# Patient Record
Sex: MALE | Race: WHITE | HISPANIC OR LATINO | URBAN - METROPOLITAN AREA
[De-identification: names, ages, dates, MRNs, and addresses within clinical notes are randomized per-mention and may not be internally consistent; named-entity substitution may affect disease eponyms.]

---

## 2020-10-15 ENCOUNTER — EMERGENCY (EMERGENCY)
Facility: HOSPITAL | Age: 85
LOS: 1 days | Discharge: HOME | End: 2020-10-15
Attending: EMERGENCY MEDICINE | Admitting: EMERGENCY MEDICINE
Payer: MEDICARE

## 2020-10-15 VITALS
SYSTOLIC BLOOD PRESSURE: 158 MMHG | RESPIRATION RATE: 16 BRPM | TEMPERATURE: 98 F | HEIGHT: 61 IN | HEART RATE: 48 BPM | WEIGHT: 126.99 LBS | OXYGEN SATURATION: 100 % | DIASTOLIC BLOOD PRESSURE: 84 MMHG

## 2020-10-15 VITALS
RESPIRATION RATE: 16 BRPM | OXYGEN SATURATION: 98 % | HEART RATE: 54 BPM | DIASTOLIC BLOOD PRESSURE: 79 MMHG | SYSTOLIC BLOOD PRESSURE: 149 MMHG | TEMPERATURE: 98 F

## 2020-10-15 PROCEDURE — 72125 CT NECK SPINE W/O DYE: CPT | Mod: 26

## 2020-10-15 PROCEDURE — 99284 EMERGENCY DEPT VISIT MOD MDM: CPT | Mod: 25

## 2020-10-15 PROCEDURE — 72125 CT NECK SPINE W/O DYE: CPT

## 2020-10-15 PROCEDURE — 96372 THER/PROPH/DIAG INJ SC/IM: CPT | Mod: XU

## 2020-10-15 PROCEDURE — 90715 TDAP VACCINE 7 YRS/> IM: CPT

## 2020-10-15 PROCEDURE — 70450 CT HEAD/BRAIN W/O DYE: CPT

## 2020-10-15 PROCEDURE — 12011 RPR F/E/E/N/L/M 2.5 CM/<: CPT

## 2020-10-15 PROCEDURE — 70450 CT HEAD/BRAIN W/O DYE: CPT | Mod: 26

## 2020-10-15 PROCEDURE — 90471 IMMUNIZATION ADMIN: CPT

## 2020-10-15 RX ORDER — LEVOTHYROXINE SODIUM 125 MCG
1 TABLET ORAL
Qty: 0 | Refills: 0 | DISCHARGE

## 2020-10-15 RX ORDER — ASPIRIN/CALCIUM CARB/MAGNESIUM 324 MG
1 TABLET ORAL
Qty: 0 | Refills: 0 | DISCHARGE

## 2020-10-15 RX ORDER — MEMANTINE HYDROCHLORIDE 10 MG/1
0 TABLET ORAL
Qty: 0 | Refills: 0 | DISCHARGE

## 2020-10-15 RX ORDER — ACETAMINOPHEN 500 MG
650 TABLET ORAL ONCE
Refills: 0 | Status: COMPLETED | OUTPATIENT
Start: 2020-10-15 | End: 2020-10-15

## 2020-10-15 RX ORDER — RISPERIDONE 4 MG/1
0 TABLET ORAL
Qty: 0 | Refills: 0 | DISCHARGE

## 2020-10-15 RX ORDER — TETANUS TOXOID, REDUCED DIPHTHERIA TOXOID AND ACELLULAR PERTUSSIS VACCINE, ADSORBED 5; 2.5; 8; 8; 2.5 [IU]/.5ML; [IU]/.5ML; UG/.5ML; UG/.5ML; UG/.5ML
0.5 SUSPENSION INTRAMUSCULAR ONCE
Refills: 0 | Status: COMPLETED | OUTPATIENT
Start: 2020-10-15 | End: 2020-10-15

## 2020-10-15 RX ORDER — DONEPEZIL HYDROCHLORIDE 10 MG/1
1 TABLET, FILM COATED ORAL
Qty: 0 | Refills: 0 | DISCHARGE

## 2020-10-15 RX ADMIN — Medication 650 MILLIGRAM(S): at 17:10

## 2020-10-15 RX ADMIN — Medication 1 MILLIGRAM(S): at 17:36

## 2020-10-15 RX ADMIN — Medication 650 MILLIGRAM(S): at 16:10

## 2020-10-15 RX ADMIN — TETANUS TOXOID, REDUCED DIPHTHERIA TOXOID AND ACELLULAR PERTUSSIS VACCINE, ADSORBED 0.5 MILLILITER(S): 5; 2.5; 8; 8; 2.5 SUSPENSION INTRAMUSCULAR at 16:10

## 2020-10-15 NOTE — ED PROVIDER NOTE - NSFOLLOWUPINSTRUCTIONS_ED_ALL_ED_FT
Take Tylenol for headaches. Return to ED for any worsening condition. Return for headache, vomiting, numbness/weakness, slurred speech, or any worsening condition.       Traumatismo craneal    LO QUE NECESITA SABER:    Un traumatismo craneal puede incluir el cuero cabelludo, la chayito, el cráneo o el cerebro y puede variar de leve a grave. Los efectos pueden aparecer inmediatamente después de la lesión o desarrollarse más tarde. Los efectos pueden durar poco tiempo o ser permanentes. Es posible que los médicos quieran revisar govea recuperación con el tiempo. El tratamiento puede cambiar a medida que usted se recupera o desarrolla nuevos problemas de pratik por el traumatismo craneal.    INSTRUCCIONES SOBRE [facility]:    Llame al número local de emergencias (911 en los [place]), o pídale a alguien que llame si:  •No es posible despertarlo.      •Usted sufre pawel convulsión.      •Usted zak de reaccionar cuando le hablan o se desmaya.      •Usted tiene visión borrosa o doble.      •Usted arrastra las palabras o se confunde al hablar.      •Usted tiene debilidad en govea brazo o pierna, pierde la sensación o presenta nuevos problemas de coordinación.      •Lena pupilas son más grandes de lo habitual o pawel pupila es de tamaño diferente de la otra.      •A usted le sale latonya o un líquido leobardo de los oídos o la nariz.      Busque atención médica de inmediato si:  •Usted tiene vómitos reiterados o brigitte.      •Se siente confundido.      •El dolor de sofia empeora o se vuelve intenso.      •Usted o pawel persona que lo cuida nota que le hyun más despertarse que de costumbre.      Llame a govea médico si:  •Lena síntomas xiong más de 6 semanas después de la lesión.      •Usted tiene preguntas o inquietudes acerca de govea condición o cuidado.      Medicamentos:  •Acetaminofénalivia el dolor y baja la fiebre. Está disponible sin receta médica. Pregunte la cantidad y la frecuencia con que debe tomarlos. Siga las indicaciones. Corinna las etiquetas de todos los demás medicamentos que esté usando para saber si también contienen acetaminofén, o pregunte a govea médico o farmacéutico. El acetaminofén puede causar daño en el hígado cuando no se brittany de forma correcta. No use más de 4 gramos (4000 miligramos) en total de acetaminofeno en un día.      •[place] lena medicamentos jillian se le haya indicado.Consulte con govea médico si usted bailey que govea medicamento no le está ayudando o si presenta efectos secundarios. Infórmele si es alérgico a cualquier medicamento. Mantenga pawel lista actualizada de los medicamentos, las vitaminas y los productos herbales que brittany. Incluya los siguientes datos de los medicamentos: cantidad, frecuencia y motivo de administración. Traiga con usted la lista o los envases de las píldoras a lena citas de seguimiento. Lleve la lista de los medicamentos con usted en blake de pawel emergencia.      Cuidados personales:  •Descanseo steven actividades tranquilas. Limite govea tiempo viendo la televisión, utilizando la computadora o realizando tareas que requieren mucho pensamiento. Regrese lentamente lena actividades acostumbradas jillian le indiquen. No practique deportes ni steven actividades que puedan provocarle un golpe en la sofia. Pregunte a govea médico cuándo puede regresar al deporte.      •Aplique hieloen la sofia de 15 a 20 minutos cada hora o jillian se le indique. Use pawel compresa de hielo o ponga hielo triturado en pawel bolsa de plástico. Cúbralo con pawel toalla antes de aplicarlo sobre govea piel. El hielo ayuda a evitar daño al tejido y a disminuir la inflamación y el dolor.      •Solicite que alguien se quede con usted por 24 horas, o jillian se le indique. Esta persona puede monitorearlo para detectar problemas y pedir ayuda si es necesario. Cuando se despierte, didier persona debe hacerle algunas preguntas cada pocas horas para fran si usted está pensando con claridad. Un ejemplo es preguntarle govea nombre o govea dirección.      Prevenga otro traumatismo craneal:  •Use un courtney que se ajuste correctamente.Steven esto cuando practica deportes, o alvaro pawel bicicleta, scooter o patineta. Los cascos ayudan a disminuir el riesgo de un traumatismo craneal grave. Hable con govea médico acerca de otras maneras en las que usted puede protegerse si practica deportes.      •Use el cinturón de seguridad cada vez que esté en un automóvil.[place] ayuda a disminuir el riesgo de sufrir un traumatismo craneal si tiene un accidente.      Acuda a la consulta de control con govea médico según las indicaciones:Anote lena preguntas para que se acuerde de hacerlas jackson lena visitas.

## 2020-10-15 NOTE — ED PROVIDER NOTE - CARE PROVIDER_API CALL
Hardy Oseguera  NEUROLOGY  [address] [place] [address]  Phone: [phone]  Fax: [phone]  Follow Up Time: 1-3 Days

## 2020-10-15 NOTE — ED ADULT NURSE REASSESSMENT NOTE - NS ED NURSE REASSESS COMMENT FT1
Patient was discharged, stood up and felt dizzy. Patient stumbled and was assisted back in bed by staff. VSS Patient was discharged, stood up and felt dizzy. Per patient's son, he caught patient and stopped him from falling. Patient was assisted back in bed by staff. VSS. Patient did not hit head, no injuries.

## 2020-10-15 NOTE — ED ADULT NURSE NOTE - OBJECTIVE STATEMENT
87 y/o male presents with complaints of laceration S/P fall. States he was here at the hospital for pre-surgical testing when he was leaving he tripped and fell. States he hit the right side of his head on the floor causing a laceration. Denies pain or discomfort. Denies LOC. Plan of care discussed with patient. Comfort and safety maintained. Will continue to monitor.

## 2020-10-15 NOTE — ED ADULT NURSE NOTE - NSIMPLEMENTINTERV_GEN_ALL_ED
Implemented All Fall Risk Interventions:  [place] to call system. Call bell, personal items and telephone within reach. Instruct patient to call for assistance. Room bathroom lighting operational. Non-slip footwear when patient is off stretcher. Physically safe environment: no spills, clutter or unnecessary equipment. Stretcher in lowest position, wheels locked, appropriate side rails in place. Provide visual cue, wrist band, yellow gown, etc. Monitor gait and stability. Monitor for mental status changes and reorient to person, place, and time. Review medications for side effects contributing to fall risk. Reinforce activity limits and safety measures with patient and family.

## 2020-10-15 NOTE — ED PROVIDER NOTE - CLINICAL SUMMARY MEDICAL DECISION MAKING FREE TEXT BOX
c/o trip and fall with head injury and skin tear to right eye brow. plan includes adacel, Ct head r/o CVA, Ct cervical r/o fx, re-assess

## 2020-10-15 NOTE — ED ADULT NURSE NOTE - CHPI ED NUR SYMPTOMS NEG
no fever/no loss of consciousness/no weakness/no deformity/no confusion/no tingling/no numbness/no vomiting

## 2020-10-15 NOTE — ED PROVIDER NOTE - ATTENDING CONTRIBUTION TO CARE
Pt seen and examined and d/w PA.  agree with a and p.  pt is a 83 yo male , dementia, sp trip and fall, no loc, hit right side of head just lateral to eyebrow with small laceration,  needs td, no  loc, n/v, numbness, weakness and no co. pt on exam with 1cm laceration with surrounding abrasion just to side of right eyebrow, neuro intact, gcs 15, c spine nt,  pt walking around er, no bony ttp, motor 5/5.  abd benign, spine nt,   check ct head, c spine, glue wound, tdap. d/c if neg

## 2020-10-15 NOTE — ED PROVIDER NOTE - PATIENT PORTAL LINK FT
You can access the FollowMyHealth Patient Portal offered by [facility] by registering at the following website: [email or web address]. By joining [facility]’s FollowMyHealth portal, you will also be able to view your health information using other applications (apps) compatible with our system.

## 2020-10-15 NOTE — ED PROVIDER NOTE - PHYSICAL EXAMINATION
Constitutional: Awake, Alert, non-toxic. NAD. Well appearing, well nourished.   HEAD: Normocephalic, atraumatic.   EYES: PERRL, EOM intact, conjunctiva and sclera are clear bilaterally. No raccoon eyes.   ENT: (+) right eye brow skin tear, no laceration, no gaping wound, no active blood loss, No rhinorrhea, normal pharynx, patent, no tonsillar exudate or enlargement, mucous membranes pink/moist, no erythema, no drooling or stridor.   NECK: Supple, non-tender   BACK: No midline or paraspinal TTP of cervical/thoracic/lumbar spine, FROM. No ecchymosis or hematomas.   CARDIOVASCULAR: Normal S1, S2; regular rate and rhythm.  RESPIRATORY: Normal respiratory effort; breath sounds CTAB, no wheezes, rhonchi, or rales. Speaking in full sentences. No accessory muscle use.   ABDOMEN: Soft; non-tender, non-distended.   EXTREMITIES: Full passive and active ROM in all extremities; hip/ribs non-tender to palpation; distal pulses palpable and symmetric, no edema, no crepitus or step off  SKIN: Warm, dry; good skin turgor, no apparent lesions or rashes, no ecchymosis, brisk capillary refill.  NEURO: A&O x3. Sensory and motor functions are grossly intact. Speech is normal. Appearance and judgement seem appropriate for gender and age. No neurological deficits. Neurovascular sensation intact motor function 5/5 of upper and lower extremities, CN II-XII grossly intact, no ataxia, absent pronator drift, intact cerebellar function. Speech clear, without articulation or word-finding difficulties. Eyes- PERRL bilaterally. EOMs in tact. No nystagmus. No facial droop.

## 2020-10-15 NOTE — ED PROVIDER NOTE - PROGRESS NOTE DETAILS
Wound closed with Dermabond. pt was agitated at Ct and was not complying with laying down. Ativan provided. CT noted no acute CVA/fx. Advised followup with PCP. ER return precautions discussed As per RN, upon discharge patient stood up and almost fell. As per Son, patient was caught and did not fall to ground. Pt recently given Ativan. Discussed risk of fall. pt was offered further observation but pt son and pt refusing, requesting discharge immediately. Reports patient lives with his son, and will be able to observe patient. Discussed risks, verbal understanding provided.

## 2020-10-15 NOTE — ED PROVIDER NOTE - OBJECTIVE STATEMENT
85 y/o male with reported PMHx Dementia presents today with son c/o head injury. pt reports he tripped and fell while leaving the hospital after pre-surgical testing for colostomy reversal. pt reports feeling well without complaints. reports wound to right eyebrow. pt denies headache, blood thinner use, numbness/weakness, LOC, vomiting, chest pain, SOB, prodromal symptoms, or any other complaints.

## 2023-04-27 NOTE — ED PROCEDURE NOTE - CPROC ED INFORMED CONSENT1
[Normal] : external ears are normal bilaterally Benefits, risks, and possible complications of procedure explained to patient/caregiver who verbalized understanding and gave verbal consent. [de-identified] : AD TIPP, AS serous effusion

## 2025-05-29 NOTE — ED PROCEDURE NOTE - CPROC ED DIRECTIONAL
Copied from CRM #[id number]. Topic: MW Messaging - MW Patient Request  >> May 29, 2025  9:37 AM Vivek BROWN wrote:  --DO NOT REPLY - Sent from PACT - If sent to wrong pool, reroute to P ECO Reroute pool --    General Message: Patient has a scheduled colonoscopy 05/30/2025 and pre admission is calling requesting for the office to please send the colonoscopy Prep kit to the pharmacy([facility]) #[id number] [facility]  Callback #: [phone]  Can a detailed message be left? Yes - Voicemail   Caller has been advised this message will be addressed within:1 business day [high priority]         right